# Patient Record
Sex: MALE | Race: WHITE | NOT HISPANIC OR LATINO | Employment: FULL TIME | URBAN - METROPOLITAN AREA
[De-identification: names, ages, dates, MRNs, and addresses within clinical notes are randomized per-mention and may not be internally consistent; named-entity substitution may affect disease eponyms.]

---
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
## 2019-01-07 ENCOUNTER — OCCUPATIONAL MEDICINE (OUTPATIENT)
Dept: URGENT CARE | Facility: PHYSICIAN GROUP | Age: 19
End: 2019-01-07
Payer: COMMERCIAL

## 2019-01-07 VITALS
WEIGHT: 229 LBS | BODY MASS INDEX: 36.8 KG/M2 | DIASTOLIC BLOOD PRESSURE: 74 MMHG | OXYGEN SATURATION: 98 % | HEIGHT: 66 IN | HEART RATE: 72 BPM | SYSTOLIC BLOOD PRESSURE: 126 MMHG | TEMPERATURE: 98.2 F

## 2019-01-07 DIAGNOSIS — S23.41XD SPRAIN OF RIBS, SUBSEQUENT ENCOUNTER: ICD-10-CM

## 2019-01-07 PROCEDURE — 99214 OFFICE O/P EST MOD 30 MIN: CPT | Mod: 29 | Performed by: PHYSICIAN ASSISTANT

## 2019-01-07 ASSESSMENT — ENCOUNTER SYMPTOMS
DIARRHEA: 0
CHILLS: 0
NAUSEA: 0
VOMITING: 0
WEAKNESS: 0
CONSTIPATION: 0
SHORTNESS OF BREATH: 0
ABDOMINAL PAIN: 0
COUGH: 0
FEVER: 0
FALLS: 0
BACK PAIN: 0

## 2019-01-07 NOTE — PROGRESS NOTES
Subjective:   José Luis Gil is a 18 y.o. male who presents for Rib Injury ([facility] DOI 12/22/18 Rib pain )    Copied from visit on 12/23/18 - DOI 12/22/18: Patient was at work yesterday pushing and moving around carts when he felt and heard a pop in the left side of his chest. He states the pain was severe and brought him down to his knees. Currently rates pain as 7/10 which worsens with palpation. Has taken ibuprofen for the pain. Denies SOB, sensory changes, weakness.  Rib Injury   Pertinent negatives include no chest pain, coughing or weakness.   Patient was at work yesterday pushing and moving around carts when he felt and heard a pop in the left side of his chest. He states the pain was severe and brought him down to his knees. Currently rates pain as 7/10 which worsens with palpation. Has taken ibuprofen for the pain. Denies SOB, sensory changes, weakness    F/u 1/7/18: Pt pain has much improved. Pt described pain as 1/10 intermittently. Pt able to lift approx 50 lbs without pain. No numbness or tingling extremities. No diffuctilty breathing. Pt taking ibuprofen 400mg Q 4 hours. No pain with ROM. No bruising or swelling. Pt is able to return work full duty.    HPI  Review of Systems   Constitutional: Negative for chills, fever and malaise/fatigue.   Respiratory: Negative for cough and shortness of breath.    Gastrointestinal: Negative for abdominal pain, constipation, diarrhea, nausea and vomiting.   Musculoskeletal: Negative for back pain, falls and joint pain.   Neurological: Negative for weakness.   All other systems reviewed and are negative.      PMH:  has no past medical history on file.  MEDS: No current outpatient prescriptions on file.  ALLERGIES:   Allergies   Allergen Reactions   • Naproxen      SURGHX: History reviewed. No pertinent surgical history.  SOCHX:  reports that he has never smoked. He has never used smokeless tobacco.  History reviewed. No pertinent family history.     Objective:  "  /74 (BP Location: Right arm, Patient Position: Sitting, BP Cuff Size: Adult)   Pulse 72   Temp 36.8 °C (98.2 °F) (Temporal)   Ht 1.676 m (5' 6\")   Wt 103.9 kg (229 lb)   SpO2 98%   BMI 36.96 kg/m²     Physical Exam   Constitutional: He is oriented to person, place, and time. He appears well-developed and well-nourished. No distress.   HENT:   Head: Normocephalic and atraumatic.   Eyes: Pupils are equal, round, and reactive to light. Conjunctivae are normal.   Cardiovascular: Normal rate and regular rhythm.    Pulmonary/Chest: Effort normal and breath sounds normal.   Musculoskeletal: Normal range of motion.        Thoracic back: Normal. He exhibits normal range of motion, no tenderness, no bony tenderness, no swelling, no edema, no deformity, no pain and no spasm.   ROM, strength, tone intact all extremities.  Normal gait.   Neurological: He is alert and oriented to person, place, and time.   Skin: Skin is warm and dry.   Psychiatric: He has a normal mood and affect. His behavior is normal.   Vitals reviewed.       Assessment/Plan:     1. Sprain of ribs, subsequent encounter       Patient released to MMI, D 39 provided.  He can continue ibuprofen 600-800 mg as needed.    Red flags and emergency room precautions discussed.  Patient appears understanding of information.       "

## 2019-01-07 NOTE — LETTER
"[facility] [address] - [place] [address]  Phone:  [phone] - Fax:  [phone]   [facility] Progress Report and Disability Certification  Date of Service: 1/7/2019   No Show:  No  Date / Time of Next Visit:     Claim Information   Patient Name: José Luis Gil  Claim Number:     Employer: [id number]  Date of Injury: 12/22/2018     Insurer / TPA: [employer]  ID / SSN:     Occupation: CSM  Diagnosis: The encounter diagnosis was Sprain of ribs, subsequent encounter.    Medical Information   Related to Industrial Injury? Yes    Subjective Complaints:  Copied from visit on 12/23/18 - DOI 12/22/18: Patient was at work yesterday pushing and moving around carts when he felt and heard a pop in the left side of his chest. He states the pain was severe and brought him down to his knees. Currently rates pain as 7/10 which worsens with palpation. Has taken ibuprofen for the pain. Denies SOB, sensory changes, weakness.  Rib Injury   Pertinent negatives include no chest pain, coughing or weakness.   Patient was at work yesterday pushing and moving around carts when he felt and heard a pop in the left side of his chest. He states the pain was severe and brought him down to his knees. Currently rates pain as 7/10 which worsens with palpation. Has taken ibuprofen for the pain. Denies SOB, sensory changes, weakness    F/u 1/7/18: Pt pain has much improved. Pt described pain as 1/10 intermittently. Pt able to lift approx 50 lbs without pain. No numbness or tingling extremities. No diffuctilty breathing. Pt taking ibuprofen 400mg Q 4 hours. No pain with ROM. No bruising or swelling. Pt is able to return work full duty.    Objective Findings: /74 (BP Location: Right arm, Patient Position: Sitting, BP Cuff Size: Adult)   Pulse 72   Temp 36.8 °C (98.2 °F) (Temporal)   Ht 1.676 m (5' 6\")   Wt 103.9 kg (229 lb)   SpO2 98% "   BMI 36.96 kg/m²     Physical Exam   Constitutional: He is oriented to person, place, and time. He appears well-developed and well-nourished. No distress.   HENT:   Head: Normocephalic and atraumatic.   Eyes: Pupils are equal, round, and reactive to light. Conjunctivae are normal.   Cardiovascular: Normal rate and regular rhythm.    Pulmonary/Chest: Effort normal and breath sounds normal.   Musculoskeletal: Normal range of motion.        Thoracic back: Normal. He exhibits normal range of motion, no tenderness, no bony tenderness, no swelling, no edema, no deformity, no pain and no spasm.   ROM, strength, tone intact all extremities.  Normal gait.   Neurological: He is alert and oriented to person, place, and time.   Skin: Skin is warm and dry.   Psychiatric: He has a normal mood and affect. His behavior is normal.   Vitals reviewed.     Pre-Existing Condition(s):     Assessment:   Condition Improved    Status: Discharged /  MMI  Permanent Disability:No    Plan: Medication  Comments:Ibuprofen 400 mg every 6 hours    Diagnostics: X-ray  Comments:Negative    Comments:       Disability Information   Status: Released to Full Duty    From:  1/7/2019  Through:   Restrictions are:     Physical Restrictions   Sitting:    Standing:    Stooping:    Bending:      Squatting:    Walking:    Climbing:    Pushing:      Pulling:    Other:    Reaching Above Shoulder (L):   Reaching Above Shoulder (R):       Reaching Below Shoulder (L):    Reaching Below Shoulder (R):      Not to exceed Weight Limits   Carrying(hrs):   Weight Limit(lb):   Lifting(hrs):   Weight  Limit(lb):     Comments:      Repetitive Actions   Hands: i.e. Fine Manipulations from Grasping:     Feet: i.e. Operating Foot Controls:     Driving / Operate Machinery:     Physician Name: Leyla Larry P.A.-C. Physician Signature: LEYLA Winter P.A.-C. e-Signature: Dr. Osmin Tapia, Medical Director   Clinic Name / Location: [facility]  [address]  [place] [address] Clinic Phone Number: Dept: [phone]   Appointment Time: 12:00 Pm Visit Start Time: 12:08 PM   Check-In Time:  11:56 Am Visit Discharge Time: 12:40 PM   Original-Treating Physician or Chiropractor    Page 2-Insurer/TPA    Page 3-Employer    Page 4-Employee

## 2024-05-28 ENCOUNTER — PHARMACY VISIT (OUTPATIENT)
Dept: PHARMACY | Facility: MEDICAL CENTER | Age: 24
End: 2024-05-28
Payer: COMMERCIAL

## 2024-05-28 ENCOUNTER — HOSPITAL ENCOUNTER (EMERGENCY)
Facility: MEDICAL CENTER | Age: 24
End: 2024-05-28
Attending: EMERGENCY MEDICINE
Payer: COMMERCIAL

## 2024-05-28 VITALS
HEART RATE: 113 BPM | DIASTOLIC BLOOD PRESSURE: 84 MMHG | OXYGEN SATURATION: 94 % | TEMPERATURE: 97.8 F | WEIGHT: 241.4 LBS | HEIGHT: 66 IN | BODY MASS INDEX: 38.8 KG/M2 | RESPIRATION RATE: 14 BRPM | SYSTOLIC BLOOD PRESSURE: 130 MMHG

## 2024-05-28 DIAGNOSIS — M54.6 ACUTE MIDLINE THORACIC BACK PAIN: ICD-10-CM

## 2024-05-28 DIAGNOSIS — J06.9 VIRAL URI: ICD-10-CM

## 2024-05-28 DIAGNOSIS — R09.81 NASAL CONGESTION: ICD-10-CM

## 2024-05-28 PROCEDURE — 99282 EMERGENCY DEPT VISIT SF MDM: CPT

## 2024-05-28 PROCEDURE — RXMED WILLOW AMBULATORY MEDICATION CHARGE: Performed by: EMERGENCY MEDICINE

## 2024-05-28 PROCEDURE — RXOTC WILLOW AMBULATORY OTC CHARGE

## 2024-05-28 RX ORDER — FEXOFENADINE HCL AND PSEUDOEPHEDRINE HCI 180; 240 MG/1; MG/1
1 TABLET, EXTENDED RELEASE ORAL DAILY
Qty: 10 TABLET | Refills: 0 | Status: SHIPPED | OUTPATIENT
Start: 2024-05-28 | End: 2024-05-28

## 2024-05-28 RX ORDER — OXYCODONE HYDROCHLORIDE AND ACETAMINOPHEN 5; 325 MG/1; MG/1
1 TABLET ORAL EVERY 4 HOURS PRN
Qty: 15 TABLET | Refills: 0 | Status: SHIPPED | OUTPATIENT
Start: 2024-05-28 | End: 2024-05-28

## 2024-05-28 RX ORDER — OXYCODONE HYDROCHLORIDE AND ACETAMINOPHEN 5; 325 MG/1; MG/1
1 TABLET ORAL EVERY 4 HOURS PRN
Qty: 15 TABLET | Refills: 0 | Status: SHIPPED | OUTPATIENT
Start: 2024-05-28 | End: 2024-06-02

## 2024-05-28 RX ORDER — OXYMETAZOLINE HYDROCHLORIDE 0.05 G/100ML
2 SPRAY NASAL 2 TIMES DAILY
Qty: 15 ML | Refills: 0 | Status: SHIPPED | OUTPATIENT
Start: 2024-05-28 | End: 2024-05-31

## 2024-05-28 RX ORDER — OXYMETAZOLINE HYDROCHLORIDE 0.05 G/100ML
2 SPRAY NASAL 2 TIMES DAILY
Qty: 15 ML | Refills: 0 | Status: SHIPPED | OUTPATIENT
Start: 2024-05-28 | End: 2024-05-28

## 2024-05-28 RX ORDER — FEXOFENADINE HCL AND PSEUDOEPHEDRINE HCI 180; 240 MG/1; MG/1
1 TABLET, EXTENDED RELEASE ORAL DAILY
Qty: 10 TABLET | Refills: 0 | Status: SHIPPED | OUTPATIENT
Start: 2024-05-28 | End: 2024-06-07

## 2024-05-29 NOTE — ED PROVIDER NOTES
"  ER Provider Note    Scribed for Davin Woodall M.D. by Alida Brown. 5/28/2024   9:41 PM    Primary Care Provider: None noted    CHIEF COMPLAINT  Chief Complaint   Patient presents with    Back Pain     Pt reports thoracic pain, HA and congestion x 3 days. Pt reports he had taken multiple OTC medications and was starting to feel better, today his back flared back up and reports 3 near syncopal episodes on the way to the ED.      EXTERNAL RECORDS REVIEWED  The patient was last seen in 2021 for COVID and was negative.     HPI/ROS  José Luis Gil is a 23 y.o. male who presents to the ED for evaluation of mid back pain onset 3 days ago. He reports associated rhinorrhea, body aches, and congestion starting 3 days ago. The patient thinks he may have sneezed and strained something in his back. He reports taking Tylenol for pain. He adds he then began develop a cough. He is having difficulty sitting up without assistance. The patient states he took mucinex and benadryl without relief. He states woke up from a nap today at 7:00 PM and woke up feeling fatigued and dizzy. He reports feeling near syncopal and having one episode of loss of consciousness for 3 seconds. The patient has a history of childhood cancer. He denies any other major medical history.     PAST MEDICAL HISTORY  History reviewed. No pertinent past medical history.    SURGICAL HISTORY  History reviewed. No pertinent surgical history.    FAMILY HISTORY  History reviewed. No pertinent family history.    SOCIAL HISTORY   reports that he has never smoked. He has never used smokeless tobacco. He reports current alcohol use. He reports that he does not use drugs.    CURRENT MEDICATIONS  None noted    ALLERGIES  Allergies   Allergen Reactions    Naproxen         PHYSICAL EXAM  BP (!) 144/90   Pulse (!) 118   Temp 36.6 °C (97.8 °F) (Temporal)   Resp 18   Ht 1.676 m (5' 6\")   Wt 110 kg (241 lb 6.5 oz)   SpO2 97%   BMI 38.96 kg/m²    Nursing note and " vitals reviewed.  Constitutional: Well-developed and well-nourished. No distress. Anxiety  HENT: Head is normocephalic and atraumatic. Oropharynx is clear and moist without exudate or erythema.   Eyes: Pupils are equal, round, and reactive to light. Conjunctiva are normal.   Cardiovascular: Normal rate and regular rhythm. No murmur heard. Normal radial pulses.  Pulmonary/Chest: Breath sounds normal. No wheezes or rales.   Abdominal: Soft and non-tender. No distention    Musculoskeletal: Extremities exhibit normal range of motion without edema or tenderness.   Neurological: Awake, alert and oriented to person, place, and time. No focal deficits noted.  Skin: Skin is warm and dry. No rash.   Psychiatric: Normal mood and affect. Appropriate for clinical situation    INITIAL ASSESSMENT AND PLAN    9:41 PM - Patient was evaluated at bedside for cold-like symptoms and back pain. The patient presents today with signs and symptoms consistent with a viral upper respiratory infection. They have a normal pulse oximetry on room air and a normal pulmonary exam. Therefore, I feel that the likelihood of pneumonia is low. This patient does not demonstrate any clinical evidence of pneumonia, meningitis, appendicitis, or other acute medical emergency. Overall, the patient is very well appearing. I do not feel that this patient would benefit from antibiotics at this time. I have recommended Tylenol and/or ibuprofen for fever. The patient clearly has mechanical back pain. I do not see evidence of a pulmonary embolism or pneumonia. He will be prescribed Oxycodone for back pain. He will be referred to get set up with a primary care physician. Patient verbalizes understanding and support with my plan of care.       DISPOSITION AND DISCUSSIONS    Escalation of care considered, and ultimately not performed: Laboratory analysis and diagnostic imaging.    Barriers to care at this time, including but not limited to: Patient does not have  established PCP.     Decision tools and prescription drugs considered including, but not limited to: Pain Medications oxycodone .    In prescribing controlled substances to this patient, I certify that I have obtained and reviewed the medical history of José Luis Gil. I have also made a good akash effort to obtain applicable records from other providers who have treated the patient and records did not demonstrate any increased risk of substance abuse that would prevent me from prescribing controlled substances.     I have conducted a physical exam and documented it. I have reviewed Mr. Gil’s prescription history as maintained by the [place] Prescription Monitoring Program.     I have assessed the patient’s risk for abuse, dependency, and addiction using the validated Opioid Risk Tool available at https://www.[employer].com/wjdyrq-izpc-fmaa-ort-narcotic-abuse.     Given the above, I believe the benefits of controlled substance therapy outweigh the risks. The reasons for prescribing controlled substances include non-narcotic, oral analgesic alternatives have been inadequate for pain control. Accordingly, I have discussed the risk and benefits, treatment plan, and alternative therapies with the patient.     OUTPATIENT MEDICATIONS:  Discharge Medication List as of 5/28/2024 10:01 PM        START taking these medications    Details   oxymetazoline (AFRIN) 0.05 % Solution Administer 2 Sprays into affected nostril(S) 2 times a day for 3 days., Disp-15 mL, R-0, Normal      fexofenadine-pseudoephedrine (ALLEGRA-D ALLERGY & CONGESTION) 180-240 MG per tablet Take 1 Tablet by mouth every day for 10 days., Disp-10 Tablet, R-0, Normal      oxyCODONE-acetaminophen (PERCOCET) 5-325 MG Tab Take 1 Tablet by mouth every four hours as needed for Moderate Pain for up to 5 days., Disp-15 Tablet, R-0, Normal             FINAL DIAGNOSIS  1. Viral URI    2. Acute midline thoracic back pain    3. Nasal congestion         Alida RIVAS  (Scribe), am scribing for, and in the presence of, Davin Woodall M.D..    Electronically signed by: Alida Brown (Scribe), 5/28/2024    IDavin M.D. personally performed the services described in this documentation, as scribed by Alida Brown in my presence, and it is both accurate and complete.      The note accurately reflects work and decisions made by me.  aDvin Woodall M.D.  5/28/2024  10:56 PM

## 2024-05-29 NOTE — ED NOTES
PT taken back to room via WC. PT able to stand and transfer to [facility] independently. PT placed on monitor, call light within reach, and chart up for ERP.

## 2024-08-06 ENCOUNTER — TELEPHONE (OUTPATIENT)
Dept: SCHEDULING | Facility: IMAGING CENTER | Age: 24
End: 2024-08-06
Payer: COMMERCIAL

## 2025-04-01 ENCOUNTER — APPOINTMENT (OUTPATIENT)
Dept: RADIOLOGY | Facility: MEDICAL CENTER | Age: 25
End: 2025-04-01
Attending: EMERGENCY MEDICINE
Payer: COMMERCIAL

## 2025-04-01 ENCOUNTER — HOSPITAL ENCOUNTER (EMERGENCY)
Facility: MEDICAL CENTER | Age: 25
End: 2025-04-02
Attending: EMERGENCY MEDICINE
Payer: COMMERCIAL

## 2025-04-01 DIAGNOSIS — R05.1 ACUTE COUGH: ICD-10-CM

## 2025-04-01 DIAGNOSIS — R42 DIZZINESS: ICD-10-CM

## 2025-04-01 DIAGNOSIS — R11.0 NAUSEA: ICD-10-CM

## 2025-04-01 LAB
ALBUMIN SERPL BCP-MCNC: 4.2 G/DL (ref 3.2–4.9)
ALBUMIN/GLOB SERPL: 1.3 G/DL
ALP SERPL-CCNC: 129 U/L (ref 30–99)
ALT SERPL-CCNC: 52 U/L (ref 2–50)
ANION GAP SERPL CALC-SCNC: 13 MMOL/L (ref 7–16)
AST SERPL-CCNC: 41 U/L (ref 12–45)
BASOPHILS # BLD AUTO: 0.4 % (ref 0–1.8)
BASOPHILS # BLD: 0.07 K/UL (ref 0–0.12)
BILIRUB SERPL-MCNC: 0.4 MG/DL (ref 0.1–1.5)
BUN SERPL-MCNC: 15 MG/DL (ref 8–22)
CALCIUM ALBUM COR SERPL-MCNC: 9.5 MG/DL (ref 8.5–10.5)
CALCIUM SERPL-MCNC: 9.7 MG/DL (ref 8.5–10.5)
CHLORIDE SERPL-SCNC: 105 MMOL/L (ref 96–112)
CO2 SERPL-SCNC: 21 MMOL/L (ref 20–33)
CREAT SERPL-MCNC: 0.92 MG/DL (ref 0.5–1.4)
EKG IMPRESSION: NORMAL
EOSINOPHIL # BLD AUTO: 0.1 K/UL (ref 0–0.51)
EOSINOPHIL NFR BLD: 0.6 % (ref 0–6.9)
ERYTHROCYTE [DISTWIDTH] IN BLOOD BY AUTOMATED COUNT: 37.8 FL (ref 35.9–50)
FLUAV RNA SPEC QL NAA+PROBE: NEGATIVE
FLUBV RNA SPEC QL NAA+PROBE: NEGATIVE
GFR SERPLBLD CREATININE-BSD FMLA CKD-EPI: 118 ML/MIN/1.73 M 2
GLOBULIN SER CALC-MCNC: 3.3 G/DL (ref 1.9–3.5)
GLUCOSE SERPL-MCNC: 116 MG/DL (ref 65–99)
HCT VFR BLD AUTO: 51 % (ref 42–52)
HGB BLD-MCNC: 16.9 G/DL (ref 14–18)
IMM GRANULOCYTES # BLD AUTO: 0.07 K/UL (ref 0–0.11)
IMM GRANULOCYTES NFR BLD AUTO: 0.4 % (ref 0–0.9)
LACTATE SERPL-SCNC: 1.7 MMOL/L (ref 0.5–2)
LYMPHOCYTES # BLD AUTO: 1.1 K/UL (ref 1–4.8)
LYMPHOCYTES NFR BLD: 6.5 % (ref 22–41)
MCH RBC QN AUTO: 27.9 PG (ref 27–33)
MCHC RBC AUTO-ENTMCNC: 33.1 G/DL (ref 32.3–36.5)
MCV RBC AUTO: 84.2 FL (ref 81.4–97.8)
MONOCYTES # BLD AUTO: 0.91 K/UL (ref 0–0.85)
MONOCYTES NFR BLD AUTO: 5.4 % (ref 0–13.4)
NEUTROPHILS # BLD AUTO: 14.75 K/UL (ref 1.82–7.42)
NEUTROPHILS NFR BLD: 86.7 % (ref 44–72)
NRBC # BLD AUTO: 0 K/UL
NRBC BLD-RTO: 0 /100 WBC (ref 0–0.2)
PLATELET # BLD AUTO: 241 K/UL (ref 164–446)
PMV BLD AUTO: 9.3 FL (ref 9–12.9)
POTASSIUM SERPL-SCNC: 3.7 MMOL/L (ref 3.6–5.5)
PROT SERPL-MCNC: 7.5 G/DL (ref 6–8.2)
RBC # BLD AUTO: 6.06 M/UL (ref 4.7–6.1)
RSV RNA SPEC QL NAA+PROBE: NEGATIVE
SARS-COV-2 RNA RESP QL NAA+PROBE: NOTDETECTED
SODIUM SERPL-SCNC: 139 MMOL/L (ref 135–145)
WBC # BLD AUTO: 17 K/UL (ref 4.8–10.8)

## 2025-04-01 PROCEDURE — 83605 ASSAY OF LACTIC ACID: CPT

## 2025-04-01 PROCEDURE — 99284 EMERGENCY DEPT VISIT MOD MDM: CPT

## 2025-04-01 PROCEDURE — A9270 NON-COVERED ITEM OR SERVICE: HCPCS | Performed by: EMERGENCY MEDICINE

## 2025-04-01 PROCEDURE — 93005 ELECTROCARDIOGRAM TRACING: CPT | Mod: TC

## 2025-04-01 PROCEDURE — 700105 HCHG RX REV CODE 258: Performed by: EMERGENCY MEDICINE

## 2025-04-01 PROCEDURE — 85025 COMPLETE CBC W/AUTO DIFF WBC: CPT

## 2025-04-01 PROCEDURE — 87040 BLOOD CULTURE FOR BACTERIA: CPT | Mod: 91

## 2025-04-01 PROCEDURE — 700102 HCHG RX REV CODE 250 W/ 637 OVERRIDE(OP): Performed by: EMERGENCY MEDICINE

## 2025-04-01 PROCEDURE — 80053 COMPREHEN METABOLIC PANEL: CPT

## 2025-04-01 PROCEDURE — 71045 X-RAY EXAM CHEST 1 VIEW: CPT

## 2025-04-01 PROCEDURE — 0241U HCHG SARS-COV-2 COVID-19 NFCT DS RESP RNA 4 TRGT ED POC: CPT

## 2025-04-01 PROCEDURE — 93005 ELECTROCARDIOGRAM TRACING: CPT | Mod: TC | Performed by: EMERGENCY MEDICINE

## 2025-04-01 PROCEDURE — 36415 COLL VENOUS BLD VENIPUNCTURE: CPT

## 2025-04-01 RX ORDER — SODIUM CHLORIDE 9 MG/ML
1000 INJECTION, SOLUTION INTRAVENOUS ONCE
Status: COMPLETED | OUTPATIENT
Start: 2025-04-02 | End: 2025-04-02

## 2025-04-01 RX ORDER — MECLIZINE HYDROCHLORIDE 25 MG/1
50 TABLET ORAL ONCE
Status: COMPLETED | OUTPATIENT
Start: 2025-04-02 | End: 2025-04-01

## 2025-04-01 RX ADMIN — MECLIZINE HYDROCHLORIDE 50 MG: 25 TABLET ORAL at 23:51

## 2025-04-01 RX ADMIN — SODIUM CHLORIDE 1000 ML: 9 INJECTION, SOLUTION INTRAVENOUS at 23:51

## 2025-04-02 ENCOUNTER — PHARMACY VISIT (OUTPATIENT)
Dept: PHARMACY | Facility: MEDICAL CENTER | Age: 25
End: 2025-04-02
Payer: COMMERCIAL

## 2025-04-02 VITALS
SYSTOLIC BLOOD PRESSURE: 136 MMHG | OXYGEN SATURATION: 95 % | RESPIRATION RATE: 18 BRPM | TEMPERATURE: 98 F | HEART RATE: 103 BPM | WEIGHT: 246.47 LBS | DIASTOLIC BLOOD PRESSURE: 86 MMHG | HEIGHT: 67 IN | BODY MASS INDEX: 38.69 KG/M2

## 2025-04-02 LAB
APPEARANCE UR: CLEAR
BILIRUB UR QL STRIP.AUTO: NEGATIVE
COLOR UR: YELLOW
GLUCOSE UR STRIP.AUTO-MCNC: NEGATIVE MG/DL
KETONES UR STRIP.AUTO-MCNC: NEGATIVE MG/DL
LEUKOCYTE ESTERASE UR QL STRIP.AUTO: NEGATIVE
MICRO URNS: NORMAL
NITRITE UR QL STRIP.AUTO: NEGATIVE
PH UR STRIP.AUTO: 8 [PH] (ref 5–8)
PROT UR QL STRIP: NEGATIVE MG/DL
RBC UR QL AUTO: NEGATIVE
SP GR UR STRIP.AUTO: 1.02
UROBILINOGEN UR STRIP.AUTO-MCNC: 1 EU/DL

## 2025-04-02 PROCEDURE — RXMED WILLOW AMBULATORY MEDICATION CHARGE: Performed by: EMERGENCY MEDICINE

## 2025-04-02 PROCEDURE — 81003 URINALYSIS AUTO W/O SCOPE: CPT

## 2025-04-02 PROCEDURE — 87086 URINE CULTURE/COLONY COUNT: CPT

## 2025-04-02 RX ORDER — MECLIZINE HYDROCHLORIDE 25 MG/1
25 TABLET ORAL 4 TIMES DAILY PRN
Qty: 40 TABLET | Refills: 0 | Status: SHIPPED | OUTPATIENT
Start: 2025-04-02

## 2025-04-02 RX ORDER — ONDANSETRON 4 MG/1
4 TABLET, ORALLY DISINTEGRATING ORAL EVERY 6 HOURS PRN
Qty: 10 TABLET | Refills: 0 | Status: SHIPPED | OUTPATIENT
Start: 2025-04-02

## 2025-04-02 RX ORDER — DIAZEPAM 10 MG/2ML
5 INJECTION, SOLUTION INTRAMUSCULAR; INTRAVENOUS ONCE
Status: DISCONTINUED | OUTPATIENT
Start: 2025-04-02 | End: 2025-04-02 | Stop reason: HOSPADM

## 2025-04-02 NOTE — ED NOTES
"José Luis Gil has been discharged from the Emergency Room.    IV discontinued and gauze bandage placed, pt in possession of belongings.    Discharge instructions, which include signs and symptoms to monitor patient for, as well as detailed information regarding dizziness provided.  Patient verbalizes understanding of follow up care and medication management. All questions and concerns addressed at this time.     Patient provided with education on when to return to the ER and verbally understands with no concerns. Patient advised on setting up MyChart and information provided about patient survey.  Patient leaves ER in no apparent distress. This RN provided education regarding returning to the ER for any new concerns or changes in patient's condition.      /86   Pulse (!) 103   Temp 36.7 °C (98 °F) (Temporal)   Resp 18   Ht 1.702 m (5' 7\")   Wt 112 kg (246 lb 7.6 oz)   SpO2 95%   BMI 38.60 kg/m²    "
Assist RN: PIV placed, pt tolerated well   
PT wheeled to restroom and back to room, PT able to transfer independently with stand by assist but reports ongoing dizziness when walking.   Primary RN informed  UA collected and sent to lab  
Pharmacy contacted regarding meds to bed.   
Pt medicated per MAR.   
Pt provided new prescription and work note.   
Pt wheeled back to room via w/c. Pt dressed in gown, connected to monitor, call light within reach.   
PAST SURGICAL HISTORY:  H/O tubal ligation

## 2025-04-02 NOTE — ED PROVIDER NOTES
"ER Provider Note    Scribed for Dr. Shiela Hardwick D.O. by Inez Swan. 4/1/2025  11:23 PM    Primary Care Provider: No primary care provider on file.    CHIEF COMPLAINT  Chief Complaint   Patient presents with    Dizziness    Chills           Cough     Pt complaining of dizziness, a cough, and chills for about an hour. He \"felt the world spilling.\"        EXTERNAL RECORDS REVIEWED  No pertinent records available for review. He was last seen here in May of 2024 for back pain.     HPI/ROS  LIMITATION TO HISTORY   Select: : None    OUTSIDE HISTORIAN(S):  None.    José Luis Gil is a 24 y.o. male who presents to the ED for evaluation of dizziness onset this morning. He describes that when he woke up this morning, he felt as though the world was spinning. Now, the patient is feeling lightheaded and as though he is going to have a syncopal episode. The patient states he also has nausea, cough that he developed tonight, and chills, but denies any shortness of breath, chest pain, productive sputum, vomiting, diarrhea.      PAST MEDICAL HISTORY  Past Medical History:   Diagnosis Date    Cancer (HCC) 2001    lymphoma, leukemia     SURGICAL HISTORY  History reviewed. No pertinent surgical history.    FAMILY HISTORY  History reviewed. No pertinent family history.    SOCIAL HISTORY   reports that he has never smoked. He has never used smokeless tobacco. He reports current alcohol use. He reports that he does not use drugs.    CURRENT MEDICATIONS  No current outpatient medications     ALLERGIES  Naproxen      PHYSICAL EXAM  BP (!) 155/90   Pulse (!) 108   Temp 36.7 °C (98 °F) (Temporal)   Resp (!) 29   Ht 1.702 m (5' 7\")   Wt 112 kg (246 lb 7.6 oz)   SpO2 94%   BMI 38.60 kg/m²     Constitutional: Patient is well developed, well nourished. Non-toxic appearing.  Mild distress from his dizziness.  HENT: Normocephalic,  TM's visualized without erythema. Nose normal with no mucosal edema or drainage. " Oropharynx moist without erythema or exudates.   Cardiovascular: Tachycardic  heart rate and Regular rhythm. No murmur  Thorax & Lungs: Clear and equal breath sounds with good excursion. No respiratory distress, no rhonchi, wheezing or rales.  Abdomen: Obese. Bowel sounds normal in all four quadrants. Soft,nontender, no rebound , guarding, palpable masses.   Skin: Warm, Dry   Extremities: Peripheral pulses 4/4 No edema, No tenderness    Neurologic: Alert & oriented x 3, Normal motor function, Normal sensory function  Psychiatric: Affect odd, Judgment normal, Mood normal.       DIAGNOSTIC STUDIES & PROCEDURES    Labs:   Results for orders placed or performed during the hospital encounter of 25   EKG    Collection Time: 25 10:09 PM   Result Value Ref Range    Report       [facility] Emergency Dept.    Test Date:  2025  Pt Name:    TERRY MALHOTRA               Department: ER  MRN:        [id number]                      Room:  Gender:     Male                         Technician: [id number]  :        2000                   Requested By:ER TRIAGE PROTOCOL  Order #:    [id number]                    Reading MD:    Measurements  Intervals                                Axis  Rate:       119                          P:          60  GA:         158                          QRS:        98  QRSD:       101                          T:          44  QT:         314  QTc:        442    Interpretive Statements  Sinus tachycardia  Borderline right axis deviation  Baseline wander in lead(s) V3  No previous ECG available for comparison     POC CoV-2, FLU A/B, RSV by PCR    Collection Time: 25 10:22 PM   Result Value Ref Range    POC Influenza A RNA, PCR Negative Negative    POC Influenza B RNA, PCR Negative Negative    POC RSV, by PCR Negative Negative    POC SARS-CoV-2, PCR NotDetected NotDetected   Lactic Acid    Collection Time: 25 10:43 PM   Result Value Ref Range    Lactic Acid 1.7  0.5 - 2.0 mmol/L   CBC with Differential    Collection Time: 04/01/25 10:43 PM   Result Value Ref Range    WBC 17.0 (H) 4.8 - 10.8 K/uL    RBC 6.06 4.70 - 6.10 M/uL    Hemoglobin 16.9 14.0 - 18.0 g/dL    Hematocrit 51.0 42.0 - 52.0 %    MCV 84.2 81.4 - 97.8 fL    MCH 27.9 27.0 - 33.0 pg    MCHC 33.1 32.3 - 36.5 g/dL    RDW 37.8 35.9 - 50.0 fL    Platelet Count 241 164 - 446 K/uL    MPV 9.3 9.0 - 12.9 fL    Neutrophils-Polys 86.70 (H) 44.00 - 72.00 %    Lymphocytes 6.50 (L) 22.00 - 41.00 %    Monocytes 5.40 0.00 - 13.40 %    Eosinophils 0.60 0.00 - 6.90 %    Basophils 0.40 0.00 - 1.80 %    Immature Granulocytes 0.40 0.00 - 0.90 %    Nucleated RBC 0.00 0.00 - 0.20 /100 WBC    Neutrophils (Absolute) 14.75 (H) 1.82 - 7.42 K/uL    Lymphs (Absolute) 1.10 1.00 - 4.80 K/uL    Monos (Absolute) 0.91 (H) 0.00 - 0.85 K/uL    Eos (Absolute) 0.10 0.00 - 0.51 K/uL    Baso (Absolute) 0.07 0.00 - 0.12 K/uL    Immature Granulocytes (abs) 0.07 0.00 - 0.11 K/uL    NRBC (Absolute) 0.00 K/uL   Complete Metabolic Panel    Collection Time: 04/01/25 10:43 PM   Result Value Ref Range    Sodium 139 135 - 145 mmol/L    Potassium 3.7 3.6 - 5.5 mmol/L    Chloride 105 96 - 112 mmol/L    Co2 21 20 - 33 mmol/L    Anion Gap 13.0 7.0 - 16.0    Glucose 116 (H) 65 - 99 mg/dL    Bun 15 8 - 22 mg/dL    Creatinine 0.92 0.50 - 1.40 mg/dL    Calcium 9.7 8.5 - 10.5 mg/dL    Correct Calcium 9.5 8.5 - 10.5 mg/dL    AST(SGOT) 41 12 - 45 U/L    ALT(SGPT) 52 (H) 2 - 50 U/L    Alkaline Phosphatase 129 (H) 30 - 99 U/L    Total Bilirubin 0.4 0.1 - 1.5 mg/dL    Albumin 4.2 3.2 - 4.9 g/dL    Total Protein 7.5 6.0 - 8.2 g/dL    Globulin 3.3 1.9 - 3.5 g/dL    A-G Ratio 1.3 g/dL   ESTIMATED GFR    Collection Time: 04/01/25 10:43 PM   Result Value Ref Range    GFR (CKD-EPI) 118 >60 mL/min/1.73 m 2   Urinalysis    Collection Time: 04/02/25 12:52 AM    Specimen: Urine   Result Value Ref Range    Color Yellow     Character Clear     Specific Gravity 1.016 <1.035    Ph 8.0  5.0 - 8.0    Glucose Negative Negative mg/dL    Ketones Negative Negative mg/dL    Protein Negative Negative mg/dL    Bilirubin Negative Negative    Urobilinogen, Urine 1.0 <=1.0 EU/dL    Nitrite Negative Negative    Leukocyte Esterase Negative Negative    Occult Blood Negative Negative    Micro Urine Req see below       All labs reviewed by me.      EKG:   I have independently interpreted this EKG       Radiology:   The attending Emergency Physician has independently interpreted the diagnostic imaging associated with this visit and is awaiting the final reading from the radiologist, which will be displayed below.    Preliminary interpretation is a follows: No acute cardiopulmonary disease.  Radiologist interpretation:    DX-CHEST-PORTABLE (1 VIEW)   Final Result         1.  No acute cardiopulmonary disease.           COURSE & MEDICAL DECISION MAKING    INITIAL ASSESSMENT AND PLAN  Care Narrative:       11:23 PM - Patient seen and evaluated at bedside. This is a 24 year old man who presents to the emergency department for evaluation of dizziness. He also complains of nausea, cough and chills. Discussed plan of care, including performing an EKG, lab work and imaging. Patient agrees to plan of care. Ordered EKG, Blood Culture x2, Urine Culture, UA, Lactic Acid, POC CoV-2 Flu A/B and RSV PCR, CMP, CBC w/ Diff., and DX-Chest to evaluate. Differential diagnoses include but are not limited to: Vertigo, dehydration, electrolyte abnormality    Patient was given IV fluids and meclizine and was improved after this.  His vital signs were much improved with a heart rate of 100 and blood pressure 136/86.  His laboratories show an elevated white blood cell count of 17,000 but he does have a history of leukemia.  Sepsis workup was performed on the patient and he responded well with normal vital signs.  I offered Valium for some additional vertigo but he stated he would prefer not to do that and wanted to go home.  Prescription for  meclizine and Zofran was sent to [facility] pharmacy and he will pick that up tonight.  He is improved and will be discharged home in stable condition    HYDRATION: Based on the patient's presentation of Tachycardia the patient was given IV fluids. IV Hydration was used because oral hydration was not as rapid as required. Upon recheck following hydration, the patient was markedly improved.                   DISPOSITION AND DISCUSSIONS  I have discussed management of the patient with the following physicians and RORY's: None    Discussion of management with other QHP or appropriate source(s): None     Escalation of care considered, and ultimately not performed: acute inpatient care management, however at this time, the patient is most appropriate for outpatient management.    Barriers to care at this time, including but not limited to: Patient does not have established PCP.     Decision tools and prescription drugs considered including, but not limited to:  Zofran and meclizine .      FINAL IMPRESSION   1. Dizziness    2. Acute cough    3. Nausea    4.  History of leukemia  IInez (Gladys), am scribing for, and in the presence of, Shiela Hardwick D.O..    Electronically signed by: Inez Swan (Gladys), 4/1/2025    IShiela D.O. personally performed the services described in this documentation, as scribed by Inez Swan in my presence, and it is both accurate and complete.    The note accurately reflects work and decisions made by me.  Shiela Hardwick D.O.  4/2/2025  1:55 AM

## 2025-04-02 NOTE — DISCHARGE INSTRUCTIONS
Make sure that you drink plenty of fluids  Over-the-counter decongestants for the next few days  Take the medications I am prescribing you as directed for dizziness.  Rest is much as possible  No work for the next 2 days.  Follow-up with your primary care doctor or [facility] by the end of the week for recheck.  Return if any problems or worsening

## 2025-04-02 NOTE — ED TRIAGE NOTES
"Chief Complaint   Patient presents with    Dizziness    Chills           Cough     Pt complaining of dizziness, a cough, and chills for about an hour. He \"felt the world spilling.\"      Vitals:    04/01/25 2204   BP: (!) 170/109   Pulse: (!) 124   Resp: (!) 32   Temp: 36.7 °C (98 °F)   SpO2: 97%     Pt scored a 3 on the sepsis screen, protocol initiated.   Viral swab pending  "

## 2025-04-04 LAB
BACTERIA UR CULT: NORMAL
SIGNIFICANT IND 70042: NORMAL
SITE SITE: NORMAL
SOURCE SOURCE: NORMAL

## 2025-04-07 LAB
BACTERIA BLD CULT: NORMAL
BACTERIA BLD CULT: NORMAL
SIGNIFICANT IND 70042: NORMAL
SIGNIFICANT IND 70042: NORMAL
SITE SITE: NORMAL
SITE SITE: NORMAL
SOURCE SOURCE: NORMAL
SOURCE SOURCE: NORMAL